# Patient Record
Sex: FEMALE | Race: WHITE | NOT HISPANIC OR LATINO | Employment: UNEMPLOYED | URBAN - NONMETROPOLITAN AREA
[De-identification: names, ages, dates, MRNs, and addresses within clinical notes are randomized per-mention and may not be internally consistent; named-entity substitution may affect disease eponyms.]

---
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
## 2021-04-30 ENCOUNTER — OFFICE VISIT (OUTPATIENT)
Dept: FAMILY MEDICINE CLINIC | Facility: CLINIC | Age: 21
End: 2021-04-30

## 2021-04-30 VITALS
HEART RATE: 97 BPM | BODY MASS INDEX: 23.53 KG/M2 | OXYGEN SATURATION: 99 % | HEIGHT: 61 IN | SYSTOLIC BLOOD PRESSURE: 116 MMHG | WEIGHT: 124.6 LBS | DIASTOLIC BLOOD PRESSURE: 76 MMHG

## 2021-04-30 DIAGNOSIS — F41.1 GAD (GENERALIZED ANXIETY DISORDER): ICD-10-CM

## 2021-04-30 DIAGNOSIS — G44.229 CHRONIC TENSION-TYPE HEADACHE, NOT INTRACTABLE: Primary | ICD-10-CM

## 2021-04-30 PROCEDURE — 99214 OFFICE O/P EST MOD 30 MIN: CPT | Performed by: NURSE PRACTITIONER

## 2021-04-30 RX ORDER — BUSPIRONE HYDROCHLORIDE 5 MG/1
5 TABLET ORAL 3 TIMES DAILY PRN
Qty: 90 TABLET | Refills: 1 | Status: SHIPPED | OUTPATIENT
Start: 2021-04-30

## 2021-04-30 RX ORDER — IBUPROFEN 800 MG/1
800 TABLET ORAL EVERY 8 HOURS PRN
Qty: 90 TABLET | Refills: 3 | Status: SHIPPED | OUTPATIENT
Start: 2021-04-30 | End: 2021-06-09

## 2021-05-07 ENCOUNTER — TELEPHONE (OUTPATIENT)
Dept: FAMILY MEDICINE CLINIC | Facility: CLINIC | Age: 21
End: 2021-05-07

## 2021-05-07 RX ORDER — HYDROXYZINE HYDROCHLORIDE 10 MG/1
TABLET, FILM COATED ORAL
Qty: 30 TABLET | Refills: 0 | Status: SHIPPED | OUTPATIENT
Start: 2021-05-07 | End: 2021-06-09

## 2021-06-15 ENCOUNTER — OFFICE VISIT (OUTPATIENT)
Dept: PODIATRY | Facility: CLINIC | Age: 21
End: 2021-06-15

## 2021-06-15 ENCOUNTER — TELEPHONE (OUTPATIENT)
Dept: PODIATRY | Facility: CLINIC | Age: 21
End: 2021-06-15

## 2021-06-15 VITALS
HEIGHT: 61 IN | SYSTOLIC BLOOD PRESSURE: 122 MMHG | DIASTOLIC BLOOD PRESSURE: 82 MMHG | BODY MASS INDEX: 23.94 KG/M2 | WEIGHT: 126.8 LBS | OXYGEN SATURATION: 99 % | HEART RATE: 96 BPM

## 2021-06-15 DIAGNOSIS — M79.675 PAIN OF TOE OF LEFT FOOT: ICD-10-CM

## 2021-06-15 DIAGNOSIS — L60.0 INGROWN TOENAIL: Primary | ICD-10-CM

## 2021-06-15 DIAGNOSIS — L03.032 PARONYCHIA, TOE, LEFT: ICD-10-CM

## 2021-06-15 PROCEDURE — 11730 AVULSION NAIL PLATE SIMPLE 1: CPT | Performed by: PODIATRIST

## 2021-06-15 PROCEDURE — 99203 OFFICE O/P NEW LOW 30 MIN: CPT | Performed by: PODIATRIST

## 2021-06-15 RX ORDER — IBUPROFEN 800 MG/1
800 TABLET ORAL EVERY 6 HOURS PRN
COMMUNITY
End: 2022-07-28

## 2021-06-15 NOTE — TELEPHONE ENCOUNTER
Note was mailed to patient and patient also activated [employer] to be able to access letter.

## 2021-06-15 NOTE — PROGRESS NOTES
"Chula Avery  2000  20 y.o. female     Patient presents to clinic today with the complaint of left hallux ingrown toenail.     06/15/2021  Chief Complaint   Patient presents with   • Left Foot - Ingrown Toenail           History of Present Illness    Chula Avery is a 20 y.o. female who presents for evaluation of left great toe pain.  She feels this due to an ingrowing toenail.  There is associated redness swelling and drainage.  She describes her pain as sharp with direct pressure.  States is been ongoing for little over a month.  Denies any significant prior issues with ingrowing toenails.      Past Medical History:   Diagnosis Date   • Anxiety    • Depression    • Headache    • Visual impairment          History reviewed. No pertinent surgical history.      Family History   Problem Relation Age of Onset   • Arthritis Mother    • Hyperlipidemia Mother    • Arthritis Father    • Hyperlipidemia Father          Social History     Socioeconomic History   • Marital status: Single     Spouse name: Not on file   • Number of children: Not on file   • Years of education: Not on file   • Highest education level: Not on file   Tobacco Use   • Smoking status: Never Smoker   • Smokeless tobacco: Never Used   Vaping Use   • Vaping Use: Never used   Substance and Sexual Activity   • Alcohol use: Never   • Drug use: Never   • Sexual activity: Defer         Current Outpatient Medications   Medication Sig Dispense Refill   • busPIRone (BUSPAR) 5 MG tablet Take 1 tablet by mouth 3 (Three) Times a Day As Needed (anxiety). 90 tablet 1   • ibuprofen (ADVIL,MOTRIN) 800 MG tablet Take 800 mg by mouth Every 6 (Six) Hours As Needed for Mild Pain .     • sulfamethoxazole-trimethoprim (BACTRIM DS,SEPTRA DS) 800-160 MG per tablet Take 1 tablet by mouth 2 (Two) Times a Day for 7 days. 14 tablet 0     No current facility-administered medications for this visit.         OBJECTIVE    /82   Pulse 96   Ht 153.7 cm (60.5\")   Wt 57.5 " kg (126 lb 12.8 oz)   LMP 06/09/2021   SpO2 99%   BMI 24.36 kg/m²       Review of Systems   Constitutional: Negative.    HENT: Negative.    Eyes: Negative.    Respiratory: Negative.    Cardiovascular: Negative.    Gastrointestinal: Negative.    Endocrine: Negative.    Genitourinary: Negative.    Musculoskeletal: Negative.    Skin: Negative.    Allergic/Immunologic: Negative.    Neurological: Negative.    Hematological: Negative.    Psychiatric/Behavioral: The patient is nervous/anxious.          Physical Exam   Constitutional: she appears well-developed and well-nourished.   CV: No chest pain. Normal RR  Resp: Non labored respirations  Psychiatric: she has a normal mood and affect. her behavior is normal.      Lower Extremity Exam:  Vascular: DP/PT pulses palpable 2+.   Mild left hallux edema  Toes warm  Neuro: Protective sensation intact, b/l.  DTRs intact  Integument: No open wounds.  Ingrown medial nail border, left hallux. Mild erythema, edema. +tenderness to palpation.  Web spaces c/d/i  No skin lesions  Musculoskeletal: LE muscle strength 5/5.   Gait normal  Ankle ROM full without pain or crepitus  STJ ROM full without pain or crepitus  No digital deformities      Nail Removal    Date/Time: 6/15/2021 2:58 PM  Performed by: Shashi Carye DPM  Authorized by: Shashi Carey DPM   Location: left foot  Location details: left big toe  Anesthesia: digital block    Anesthesia:  Local Anesthetic: lidocaine 2% without epinephrine  Anesthetic total: 3 mL  Preparation: skin prepped with Betadine  Amount removed: partial  Side: medial  Nail matrix removed: none  Dressing: 4x4 and antibiotic ointment  Patient tolerance: patient tolerated the procedure well with no immediate complications                ASSESSMENT AND PLAN    Diagnoses and all orders for this visit:    1. Ingrown toenail (Primary)    2. Pain of toe of left foot    3. Paronychia, toe, left      -Comprehensive foot and ankle exam  performed  -Diagnosis, prevention, and treatment of ingrown toe nails discussed with patient, including risks and potential benefits of nail avulsion both temporary and permanent versus simple debridement.  -Pt elected for partial  avulsion of left hallux  -Aftercare instructions for soapy gold soaks BID  -Recheck 2 weeks          This document has been electronically signed by Shashi Carey DPM on Meri 15, 2021 14:58 CDT       Much of this encounter note is an electronic transcription/translation of spoken language to printed text.   Shashi Carey DPM  6/15/2021  14:58 CDT

## 2021-06-15 NOTE — TELEPHONE ENCOUNTER
Patient was in here today for nail removal and states she was told could not work today or tomorrow and needs a work excuse.  She is to far away wants to know if you could mail or fax.  Any questions you may call [phone]

## 2021-09-06 PROCEDURE — U0004 COV-19 TEST NON-CDC HGH THRU: HCPCS | Performed by: NURSE PRACTITIONER

## 2021-09-07 ENCOUNTER — LAB (OUTPATIENT)
Dept: LAB | Facility: OTHER | Age: 21
End: 2021-09-07

## 2021-09-09 ENCOUNTER — LAB (OUTPATIENT)
Dept: LAB | Facility: OTHER | Age: 21
End: 2021-09-09

## 2021-09-09 PROCEDURE — U0004 COV-19 TEST NON-CDC HGH THRU: HCPCS | Performed by: PHYSICIAN ASSISTANT

## 2022-07-28 ENCOUNTER — OFFICE VISIT (OUTPATIENT)
Dept: FAMILY MEDICINE CLINIC | Facility: CLINIC | Age: 22
End: 2022-07-28

## 2022-07-28 VITALS
WEIGHT: 111.4 LBS | OXYGEN SATURATION: 98 % | SYSTOLIC BLOOD PRESSURE: 108 MMHG | BODY MASS INDEX: 21.87 KG/M2 | HEIGHT: 60 IN | DIASTOLIC BLOOD PRESSURE: 76 MMHG | HEART RATE: 89 BPM

## 2022-07-28 DIAGNOSIS — F41.9 ANXIETY: Chronic | ICD-10-CM

## 2022-07-28 DIAGNOSIS — Z00.00 ANNUAL PHYSICAL EXAM: Primary | ICD-10-CM

## 2022-07-28 DIAGNOSIS — D50.9 IRON DEFICIENCY ANEMIA, UNSPECIFIED IRON DEFICIENCY ANEMIA TYPE: Chronic | ICD-10-CM

## 2022-07-28 DIAGNOSIS — R63.4 WEIGHT LOSS: ICD-10-CM

## 2022-07-28 DIAGNOSIS — F12.90 MARIJUANA USE: Chronic | ICD-10-CM

## 2022-07-28 DIAGNOSIS — Z76.89 ENCOUNTER TO ESTABLISH CARE: ICD-10-CM

## 2022-07-28 DIAGNOSIS — R53.83 FATIGUE, UNSPECIFIED TYPE: ICD-10-CM

## 2022-07-28 PROCEDURE — 2014F MENTAL STATUS ASSESS: CPT | Performed by: NURSE PRACTITIONER

## 2022-07-28 PROCEDURE — 3008F BODY MASS INDEX DOCD: CPT | Performed by: NURSE PRACTITIONER

## 2022-07-28 PROCEDURE — 99395 PREV VISIT EST AGE 18-39: CPT | Performed by: NURSE PRACTITIONER

## 2022-07-28 NOTE — PROGRESS NOTES
CC: Establish Care (Wanting brain scanned, memory loss ), Annual Exam, and Weight Loss (Concerned about weight loss )      Subjective:  Chula Avery is a 21 y.o. female who presents for     Patient presents to office today to establish care she is due for checkup with labs.    CMH: Migraines, iron deficiency anemia, and anxiety. Not currently on iron therapy.  SxHx: None  FH: Arthritis, Hyperlipidemia, Heart Dz in mother  SH: Denies tobacco, drinks alcohol on occasion, Smokes marijuana on occasion    Concerned about weight loss. Ongoing for about a month now. States was 130 and now is 111 lbs. Doesn't have an appetite. States she has to force herself to eat. Denies associated pain, n/v, or diarrhea. She does have associated fatigue. States she is tired most of the time.     States doesn't plan on getting the COVID 19 vaccine. Declines to get HPV immunization today.      The following portions of the patient's history were reviewed and updated as appropriate: allergies, current medications, past family history, past medical history, past social history, past surgical history and problem list.    Past Medical History:   Diagnosis Date   • Anxiety    • Depression    • Headache    • Visual impairment          Current Outpatient Medications:   •  busPIRone (BUSPAR) 5 MG tablet, Take 1 tablet by mouth 3 (Three) Times a Day As Needed (anxiety)., Disp: 90 tablet, Rfl: 1  •  Zafemy 150-35 MCG/24HR, APPLY 1 PATCH TOPICALLY ONCE A WEEK CONTINUOUSLY AS DIRECTED, Disp: , Rfl:     Review of Systems    Review of Systems   Constitutional: Positive for activity change, appetite change, fatigue and unexpected weight change.   HENT: Negative.    Eyes: Negative.    Respiratory: Negative.    Cardiovascular: Negative.    Gastrointestinal: Negative.    Endocrine: Negative.    Genitourinary: Negative.    Musculoskeletal: Negative.    Skin: Negative.    Allergic/Immunologic: Negative.    Neurological: Negative.    Hematological: Negative.   "  Psychiatric/Behavioral: Negative.    All other systems reviewed and are negative.      Objective  Vitals:    07/28/22 1434   BP: 108/76   Pulse: 89   SpO2: 98%   Weight: 50.5 kg (111 lb 6.4 oz)   Height: 152.4 cm (60\")     Body mass index is 21.76 kg/m².    Physical Exam    Physical Exam  Vitals and nursing note reviewed.   Constitutional:       General: She is not in acute distress.     Appearance: Normal appearance. She is well-developed. She is not ill-appearing, toxic-appearing or diaphoretic.   HENT:      Head: Normocephalic and atraumatic.   Eyes:      General: No scleral icterus.        Right eye: No discharge.         Left eye: No discharge.      Extraocular Movements: Extraocular movements intact.      Conjunctiva/sclera: Conjunctivae normal.   Neck:      Vascular: No carotid bruit.   Cardiovascular:      Rate and Rhythm: Normal rate and regular rhythm.      Pulses: Normal pulses.      Heart sounds: Normal heart sounds. No murmur heard.    No friction rub. No gallop.   Pulmonary:      Effort: Pulmonary effort is normal. No respiratory distress.      Breath sounds: Normal breath sounds. No stridor. No wheezing, rhonchi or rales.   Chest:      Chest wall: No tenderness.   Abdominal:      General: Bowel sounds are normal. There is no distension.      Palpations: Abdomen is soft. There is no mass.      Tenderness: There is no abdominal tenderness. There is no guarding or rebound.      Hernia: No hernia is present.   Musculoskeletal:      Cervical back: Normal range of motion and neck supple. No rigidity or tenderness. No muscular tenderness.      Right lower leg: No edema.      Left lower leg: No edema.   Lymphadenopathy:      Cervical: No cervical adenopathy.   Skin:     General: Skin is warm and dry.      Capillary Refill: Capillary refill takes less than 2 seconds.      Coloration: Skin is not jaundiced or pale.      Findings: No bruising, erythema, lesion or rash.   Neurological:      General: No focal " deficit present.      Mental Status: She is alert and oriented to person, place, and time. Mental status is at baseline.   Psychiatric:         Mood and Affect: Mood normal.         Behavior: Behavior normal.         Thought Content: Thought content normal.         Judgment: Judgment normal.             Diagnoses and all orders for this visit:    1. Annual physical exam (Primary)  -     CBC w AUTO Differential; Future  -     Comprehensive metabolic panel; Future  -     TSH  -     T3, free; Future  -     T4, free; Future  -     Lipid Panel With LDL/HDL Ratio; Future  -     Hemoglobin A1c  -     Hepatitis C antibody; Future    2. Encounter to establish care    3. Anxiety    4. Iron deficiency anemia, unspecified iron deficiency anemia type    5. Fatigue, unspecified type  -     CBC w AUTO Differential; Future  -     Comprehensive metabolic panel; Future  -     TSH  -     T3, free; Future  -     T4, free; Future  -     Iron and TIBC; Future  -     Ferritin; Future  -     Folate; Future  -     Vitamin B12; Future  -     Vitamin D 25 Hydroxy; Future    6. Weight loss  -     CBC w AUTO Differential; Future  -     Comprehensive metabolic panel; Future  -     TSH  -     T3, free; Future  -     T4, free; Future  -     Iron and TIBC; Future  -     Ferritin; Future  -     Folate; Future  -     Vitamin B12; Future  -     Vitamin D 25 Hydroxy; Future    7. Marijuana use       Patient establish care today.  Annual physical exam performed.  Will check routine labs as above and a thyroid panel and anemia panel for the weight loss and fatigue.  Patient to return fasting to have these labs drawn.  Will notify patient of these results once they are available.  Patient counseled on needed immunizations today.  She declines COVID and HPV vaccines today.  Patient counseled on healthy diet including adding protein shakes to her meals to help with the weight loss.  Patient to follow-up in 6 months or sooner if needed for recheck.  Answered  all questions.  Patient verbalized understanding of plan of care.        This document has been electronically signed by DAMION Chavez on July 28, 2022 14:55 CDT

## 2022-07-29 ENCOUNTER — LAB (OUTPATIENT)
Dept: LAB | Facility: OTHER | Age: 22
End: 2022-07-29

## 2022-07-29 DIAGNOSIS — R53.83 FATIGUE, UNSPECIFIED TYPE: ICD-10-CM

## 2022-07-29 DIAGNOSIS — Z00.00 ANNUAL PHYSICAL EXAM: ICD-10-CM

## 2022-07-29 DIAGNOSIS — R63.4 WEIGHT LOSS: ICD-10-CM

## 2022-07-29 LAB
25(OH)D3 SERPL-MCNC: 36.3 NG/ML (ref 30–100)
ALBUMIN SERPL-MCNC: 4.3 G/DL (ref 3.5–5)
ALBUMIN/GLOB SERPL: 1.3 G/DL (ref 1.1–1.8)
ALP SERPL-CCNC: 55 U/L (ref 38–126)
ALT SERPL W P-5'-P-CCNC: 12 U/L
ANION GAP SERPL CALCULATED.3IONS-SCNC: 10 MMOL/L (ref 5–15)
AST SERPL-CCNC: 17 U/L (ref 14–36)
BASOPHILS # BLD AUTO: 0.04 10*3/MM3 (ref 0–0.2)
BASOPHILS NFR BLD AUTO: 0.7 % (ref 0–1.5)
BILIRUB SERPL-MCNC: 0.2 MG/DL (ref 0.2–1.3)
BUN SERPL-MCNC: 11 MG/DL (ref 7–23)
BUN/CREAT SERPL: 17.5 (ref 7–25)
CALCIUM SPEC-SCNC: 9.3 MG/DL (ref 8.4–10.2)
CHLORIDE SERPL-SCNC: 106 MMOL/L (ref 101–112)
CHOLEST SERPL-MCNC: 137 MG/DL (ref 0–200)
CO2 SERPL-SCNC: 26 MMOL/L (ref 22–30)
CREAT SERPL-MCNC: 0.63 MG/DL (ref 0.52–1.04)
DEPRECATED RDW RBC AUTO: 45.3 FL (ref 37–54)
EGFRCR SERPLBLD CKD-EPI 2021: 130.4 ML/MIN/1.73
EOSINOPHIL # BLD AUTO: 0.14 10*3/MM3 (ref 0–0.4)
EOSINOPHIL NFR BLD AUTO: 2.5 % (ref 0.3–6.2)
ERYTHROCYTE [DISTWIDTH] IN BLOOD BY AUTOMATED COUNT: 16.3 % (ref 12.3–15.4)
FERRITIN SERPL-MCNC: 7.3 NG/ML (ref 13–150)
FOLATE SERPL-MCNC: 19.6 NG/ML (ref 4.78–24.2)
GLOBULIN UR ELPH-MCNC: 3.3 GM/DL (ref 2.3–3.5)
GLUCOSE SERPL-MCNC: 96 MG/DL (ref 70–99)
HBA1C MFR BLD: 4.7 % (ref 4.8–5.6)
HCT VFR BLD AUTO: 33.5 % (ref 34–46.6)
HCV AB SER DONR QL: NORMAL
HDLC SERPL-MCNC: 59 MG/DL (ref 40–60)
HGB BLD-MCNC: 10.6 G/DL (ref 12–15.9)
IRON 24H UR-MRATE: 15 MCG/DL (ref 37–145)
IRON SATN MFR SERPL: 3 % (ref 20–50)
LDLC SERPL CALC-MCNC: 60 MG/DL (ref 0–100)
LDLC/HDLC SERPL: 0.98 {RATIO}
LYMPHOCYTES # BLD AUTO: 2.03 10*3/MM3 (ref 0.7–3.1)
LYMPHOCYTES NFR BLD AUTO: 36.9 % (ref 19.6–45.3)
MCH RBC QN AUTO: 24.8 PG (ref 26.6–33)
MCHC RBC AUTO-ENTMCNC: 31.6 G/DL (ref 31.5–35.7)
MCV RBC AUTO: 78.5 FL (ref 79–97)
MONOCYTES # BLD AUTO: 0.6 10*3/MM3 (ref 0.1–0.9)
MONOCYTES NFR BLD AUTO: 10.9 % (ref 5–12)
NEUTROPHILS NFR BLD AUTO: 2.69 10*3/MM3 (ref 1.7–7)
NEUTROPHILS NFR BLD AUTO: 49 % (ref 42.7–76)
PLATELET # BLD AUTO: 307 10*3/MM3 (ref 140–450)
PMV BLD AUTO: 9.6 FL (ref 6–12)
POTASSIUM SERPL-SCNC: 3.8 MMOL/L (ref 3.4–5)
PROT SERPL-MCNC: 7.6 G/DL (ref 6.3–8.6)
RBC # BLD AUTO: 4.27 10*6/MM3 (ref 3.77–5.28)
SODIUM SERPL-SCNC: 142 MMOL/L (ref 137–145)
T3FREE SERPL-MCNC: 3.08 PG/ML (ref 2–4.4)
T4 FREE SERPL-MCNC: 1.25 NG/DL (ref 0.93–1.7)
TIBC SERPL-MCNC: 466 MCG/DL (ref 298–536)
TRANSFERRIN SERPL-MCNC: 313 MG/DL (ref 200–360)
TRIGL SERPL-MCNC: 100 MG/DL (ref 0–150)
TSH SERPL DL<=0.05 MIU/L-ACNC: 4.19 UIU/ML (ref 0.27–4.2)
VIT B12 BLD-MCNC: 344 PG/ML (ref 211–946)
VLDLC SERPL-MCNC: 18 MG/DL (ref 5–40)
WBC NRBC COR # BLD: 5.5 10*3/MM3 (ref 3.4–10.8)

## 2022-07-29 PROCEDURE — 86803 HEPATITIS C AB TEST: CPT | Performed by: NURSE PRACTITIONER

## 2022-07-29 PROCEDURE — 82746 ASSAY OF FOLIC ACID SERUM: CPT | Performed by: NURSE PRACTITIONER

## 2022-07-29 PROCEDURE — 83540 ASSAY OF IRON: CPT | Performed by: NURSE PRACTITIONER

## 2022-07-29 PROCEDURE — 80061 LIPID PANEL: CPT | Performed by: NURSE PRACTITIONER

## 2022-07-29 PROCEDURE — 80050 GENERAL HEALTH PANEL: CPT | Performed by: NURSE PRACTITIONER

## 2022-07-29 PROCEDURE — 84439 ASSAY OF FREE THYROXINE: CPT | Performed by: NURSE PRACTITIONER

## 2022-07-29 PROCEDURE — 82306 VITAMIN D 25 HYDROXY: CPT | Performed by: NURSE PRACTITIONER

## 2022-07-29 PROCEDURE — 82607 VITAMIN B-12: CPT | Performed by: NURSE PRACTITIONER

## 2022-07-29 PROCEDURE — 84481 FREE ASSAY (FT-3): CPT | Performed by: NURSE PRACTITIONER

## 2022-07-29 PROCEDURE — 82728 ASSAY OF FERRITIN: CPT | Performed by: NURSE PRACTITIONER

## 2022-07-29 PROCEDURE — 83036 HEMOGLOBIN GLYCOSYLATED A1C: CPT | Performed by: NURSE PRACTITIONER

## 2022-07-29 PROCEDURE — 84466 ASSAY OF TRANSFERRIN: CPT | Performed by: NURSE PRACTITIONER

## 2022-08-01 RX ORDER — FERROUS SULFATE 325(65) MG
325 TABLET ORAL 2 TIMES DAILY
Qty: 60 TABLET | Refills: 5 | Status: SHIPPED | OUTPATIENT
Start: 2022-08-01

## 2022-08-02 ENCOUNTER — OFFICE VISIT (OUTPATIENT)
Dept: FAMILY MEDICINE CLINIC | Facility: CLINIC | Age: 22
End: 2022-08-02

## 2022-08-02 VITALS — BODY MASS INDEX: 21.79 KG/M2 | WEIGHT: 111 LBS | HEIGHT: 60 IN

## 2022-08-02 DIAGNOSIS — G47.00 INSOMNIA, UNSPECIFIED TYPE: Primary | ICD-10-CM

## 2022-08-02 DIAGNOSIS — R05.9 COUGH: ICD-10-CM

## 2022-08-02 PROCEDURE — 99442 PR PHYS/QHP TELEPHONE EVALUATION 11-20 MIN: CPT | Performed by: NURSE PRACTITIONER

## 2022-08-02 RX ORDER — METHYLPREDNISOLONE 4 MG/1
TABLET ORAL
Qty: 21 TABLET | Refills: 0 | Status: SHIPPED | OUTPATIENT
Start: 2022-08-02 | End: 2022-08-15

## 2022-08-02 RX ORDER — TRAZODONE HYDROCHLORIDE 50 MG/1
50 TABLET ORAL NIGHTLY
Qty: 30 TABLET | Refills: 5 | Status: SHIPPED | OUTPATIENT
Start: 2022-08-02 | End: 2022-08-15

## 2022-08-02 NOTE — PROGRESS NOTES
CC: trouble sleeping (Taking melatonin, not helping. Trouble falling asleep and staying asleep ) and URI (Started 1-2 weeks ago, coughing stopped up and sore throat )      Subjective:  Chula Avery is a 21 y.o. female who presents for         This is a telephone visit because of pandemic.  Patient complains of trouble sleeping.  She takes melatonin which is not helping she has trouble falling asleep and staying asleep. Ongoing for as long as she can remember. States she has to take 6 melatonin gummies and Nyquil to help her sleep. Gets about 3-12 hours of sleep per night. States her sleep cycle fluctuates drastically. States melatonin and Nyquil is the only thing she has ever taken to help her sleep.    C/O cough. Ongoing x 1 week. The cough is dry. States her throat is sore from coughing. States she took an at home covid test and it was negative. Has treated with OTC allergy medication without resolution of cough. She has had some nasal congestion and the allergy medication helped that.      The following portions of the patient's history were reviewed and updated as appropriate: allergies, current medications, past family history, past medical history, past social history, past surgical history and problem list.    Past Medical History:   Diagnosis Date   • Anxiety    • Depression    • Headache    • Visual impairment          Current Outpatient Medications:   •  busPIRone (BUSPAR) 5 MG tablet, Take 1 tablet by mouth 3 (Three) Times a Day As Needed (anxiety)., Disp: 90 tablet, Rfl: 1  •  ferrous sulfate 325 (65 FE) MG tablet, Take 1 tablet by mouth 2 (Two) Times a Day., Disp: 60 tablet, Rfl: 5  •  Zafemy 150-35 MCG/24HR, APPLY 1 PATCH TOPICALLY ONCE A WEEK CONTINUOUSLY AS DIRECTED, Disp: , Rfl:   •  methylPREDNISolone (MEDROL) 4 MG dose pack, Take as directed on package instructions., Disp: 21 tablet, Rfl: 0  •  traZODone (DESYREL) 50 MG tablet, Take 1 tablet by mouth Every Night., Disp: 30 tablet, Rfl:  "5    Review of Systems    Review of Systems   Constitutional: Negative.    HENT: Positive for congestion and sore throat.    Eyes: Negative.    Respiratory: Positive for cough.    Cardiovascular: Negative.    Gastrointestinal: Negative.    Endocrine: Negative.    Genitourinary: Negative.    Musculoskeletal: Negative.    Skin: Negative.    Allergic/Immunologic: Negative.    Neurological: Negative.    Hematological: Negative.    Psychiatric/Behavioral: Positive for sleep disturbance.   All other systems reviewed and are negative.      Objective  Vitals:    08/02/22 1258   Weight: 50.3 kg (111 lb)   Height: 152.4 cm (60\")     Body mass index is 21.68 kg/m².    Physical Exam    Physical Exam  Deferred, telephone visit because of pandemic      Diagnoses and all orders for this visit:    1. Insomnia, unspecified type (Primary)  -     traZODone (DESYREL) 50 MG tablet; Take 1 tablet by mouth Every Night.  Dispense: 30 tablet; Refill: 5    2. Cough  -     methylPREDNISolone (MEDROL) 4 MG dose pack; Take as directed on package instructions.  Dispense: 21 tablet; Refill: 0         Patient with insomnia.  She has taken NyQuil and melatonin in the past to help her sleep and this does not seem to be helping anymore.  We will start patient on trazodone 50 mg to be taken every night.  Advised to take this about an hour prior to bedtime.  Advised to devote 7 to 8 hours of sleep once this is been taken.  Advised healthy nighttime activities to help reduce insomnia.  For the cough will start patient on a Medrol Dosepak.  Patient instructed on how to take these new medications and possible side effects.  Did advise that the Medrol Dosepak could interfere with her sleep.  Patient to follow-up in 6 months or sooner if needed.  Answered all questions.  Patient verbalized understanding of plan of care.        This document has been electronically signed by DAMION Chavez on August 2, 2022 13:09 CDT     You have chosen to receive " care through a telephone visit. Do you consent to use a telephone visit for your medical care today? Yes  This visit has been rescheduled as a phone visit to comply with patient safety concerns in accordance with CDC recommendations. Total time of discussion was 11 minutes.

## 2022-08-15 ENCOUNTER — OFFICE VISIT (OUTPATIENT)
Dept: FAMILY MEDICINE CLINIC | Facility: CLINIC | Age: 22
End: 2022-08-15

## 2022-08-15 VITALS — WEIGHT: 111 LBS | HEIGHT: 60 IN | BODY MASS INDEX: 21.79 KG/M2

## 2022-08-15 DIAGNOSIS — G47.00 INSOMNIA, UNSPECIFIED TYPE: Primary | ICD-10-CM

## 2022-08-15 PROCEDURE — 99441 PR PHYS/QHP TELEPHONE EVALUATION 5-10 MIN: CPT | Performed by: NURSE PRACTITIONER

## 2022-08-15 RX ORDER — DOXEPIN HYDROCHLORIDE 100 MG/1
100 CAPSULE ORAL NIGHTLY
Qty: 30 CAPSULE | Refills: 5 | Status: SHIPPED | OUTPATIENT
Start: 2022-08-15 | End: 2022-12-28 | Stop reason: DRUGHIGH

## 2022-08-15 NOTE — PROGRESS NOTES
CC: Change in medicine (Sleep medicine )      Subjective:  Chula Avery is a 21 y.o. female who presents for         This is a telephone visit because of pandemic. Pt states the sleeping medication isn't helping. States it feels like it is keeping her up instead of helping her to sleep. She is wondering if we could change this medication. Pt has trouble falling asleep. States once she falls asleep, she will usually stay asleep. Has been taking the Trazodone for about 2 weeks without resolution of the insomnia. States she gets about 3-6 hours of sleep per night.       The following portions of the patient's history were reviewed and updated as appropriate: allergies, current medications, past family history, past medical history, past social history, past surgical history and problem list.    Past Medical History:   Diagnosis Date   • Anxiety    • Depression    • Headache    • Visual impairment          Current Outpatient Medications:   •  busPIRone (BUSPAR) 5 MG tablet, Take 1 tablet by mouth 3 (Three) Times a Day As Needed (anxiety)., Disp: 90 tablet, Rfl: 1  •  ferrous sulfate 325 (65 FE) MG tablet, Take 1 tablet by mouth 2 (Two) Times a Day., Disp: 60 tablet, Rfl: 5  •  Zafemy 150-35 MCG/24HR, APPLY 1 PATCH TOPICALLY ONCE A WEEK CONTINUOUSLY AS DIRECTED, Disp: , Rfl:   •  doxepin (SINEquan) 100 MG capsule, Take 1 capsule by mouth Every Night., Disp: 30 capsule, Rfl: 5    Review of Systems    Review of Systems   Constitutional: Negative.    HENT: Negative.    Eyes: Negative.    Respiratory: Negative.    Cardiovascular: Negative.    Gastrointestinal: Negative.    Endocrine: Negative.    Genitourinary: Negative.    Musculoskeletal: Negative.    Skin: Negative.    Allergic/Immunologic: Negative.    Neurological: Negative.    Hematological: Negative.    Psychiatric/Behavioral: Positive for sleep disturbance.   All other systems reviewed and are negative.      Objective  Vitals:    08/15/22 1005   Weight: 50.3 kg (111  "lb)   Height: 152.4 cm (60\")     Body mass index is 21.68 kg/m².    Physical Exam    Physical Exam  Deferred, Telephone visit because of pandemic      Diagnoses and all orders for this visit:    1. Insomnia, unspecified type (Primary)  -     doxepin (SINEquan) 100 MG capsule; Take 1 capsule by mouth Every Night.  Dispense: 30 capsule; Refill: 5       We will change the trazodone to doxepin 100 mg p.o. nightly.  Patient instructed on how to take this medication and possible side effects.  Patient advised of calming activities prior to bed like a warm bath.  Also advised not to engage in any stimulating activities prior to bed such as watching TV with a lot of violence.  Patient advised to follow-up as needed.  Answered all questions.  Patient verbalized understanding of plan of care.          This document has been electronically signed by DAMION Chavez on August 15, 2022 10:12 CDT     You have chosen to receive care through a telephone visit. Do you consent to use a telephone visit for your medical care today? Yes  This visit has been rescheduled as a phone visit to comply with patient safety concerns in accordance with CDC recommendations. Total time of discussion was 7 minutes.        "

## 2022-08-29 ENCOUNTER — PRIOR AUTHORIZATION (OUTPATIENT)
Dept: FAMILY MEDICINE CLINIC | Facility: CLINIC | Age: 22
End: 2022-08-29

## 2022-08-29 NOTE — TELEPHONE ENCOUNTER
Chula Avery (Gaytan: [id number]) - [id number]  Doxepin HCl 100MG capsules     Status: PA Response - Approved  Created: August 15th, 2022

## 2022-12-28 ENCOUNTER — OFFICE VISIT (OUTPATIENT)
Dept: FAMILY MEDICINE CLINIC | Facility: CLINIC | Age: 22
End: 2022-12-28

## 2022-12-28 VITALS
WEIGHT: 114 LBS | DIASTOLIC BLOOD PRESSURE: 80 MMHG | HEART RATE: 86 BPM | BODY MASS INDEX: 22.38 KG/M2 | HEIGHT: 60 IN | SYSTOLIC BLOOD PRESSURE: 116 MMHG | OXYGEN SATURATION: 99 %

## 2022-12-28 DIAGNOSIS — G47.00 INSOMNIA, UNSPECIFIED TYPE: Chronic | ICD-10-CM

## 2022-12-28 DIAGNOSIS — Z00.00 PHYSICAL EXAM: Primary | ICD-10-CM

## 2022-12-28 DIAGNOSIS — F69 BEHAVIOR CONCERN IN ADULT: ICD-10-CM

## 2022-12-28 PROCEDURE — 99213 OFFICE O/P EST LOW 20 MIN: CPT | Performed by: NURSE PRACTITIONER

## 2022-12-28 RX ORDER — DOXEPIN HYDROCHLORIDE 50 MG/1
50 CAPSULE ORAL NIGHTLY
Qty: 30 CAPSULE | Refills: 5 | Status: SHIPPED | OUTPATIENT
Start: 2022-12-28

## 2022-12-28 NOTE — PROGRESS NOTES
CC: Annual Exam (Foster physical, wants to be tested for Autism )      Subjective:  Chula Avery is a 22 y.o. female who presents for         Here for foster care physical.  Patient's mother is applying to be a foster mom.  Patient lives in the household.  Also, concerned that she may have autism. Wanting a referral for testing for this. Also, requesting the Doxepin be lowered because she is sleeping too much.      The following portions of the patient's history were reviewed and updated as appropriate: allergies, current medications, past family history, past medical history, past social history, past surgical history and problem list.    Past Medical History:   Diagnosis Date   • Anxiety    • Depression    • Headache    • Visual impairment          Current Outpatient Medications:   •  brompheniramine-pseudoephedrine-DM (Bromfed DM) 30-2-10 MG/5ML syrup, Take 5 mL by mouth Every 4 (Four) Hours As Needed for Congestion or Cough., Disp: 120 mL, Rfl: 0  •  busPIRone (BUSPAR) 5 MG tablet, Take 1 tablet by mouth 3 (Three) Times a Day As Needed (anxiety)., Disp: 90 tablet, Rfl: 1  •  ferrous sulfate 325 (65 FE) MG tablet, Take 1 tablet by mouth 2 (Two) Times a Day., Disp: 60 tablet, Rfl: 5  •  Zafemy 150-35 MCG/24HR, APPLY 1 PATCH TOPICALLY ONCE A WEEK CONTINUOUSLY AS DIRECTED, Disp: , Rfl:   •  doxepin (SINEquan) 50 MG capsule, Take 1 capsule by mouth Every Night., Disp: 30 capsule, Rfl: 5    Review of Systems    Review of Systems   Constitutional: Negative.    HENT: Negative.    Eyes: Negative.    Respiratory: Negative.    Cardiovascular: Negative.    Gastrointestinal: Negative.    Endocrine: Negative.    Genitourinary: Negative.    Musculoskeletal: Negative.    Skin: Negative.    Allergic/Immunologic: Negative.    Neurological: Negative.    Hematological: Negative.    Psychiatric/Behavioral: Positive for behavioral problems and sleep disturbance.   All other systems reviewed and are negative.      Objective  Vitals:  "   12/28/22 1041   BP: 116/80   Pulse: 86   SpO2: 99%   Weight: 51.7 kg (114 lb)   Height: 152.4 cm (60\")     Body mass index is 22.26 kg/m².    Physical Exam    Physical Exam  Vitals and nursing note reviewed.   Constitutional:       General: She is not in acute distress.     Appearance: Normal appearance. She is not ill-appearing, toxic-appearing or diaphoretic.   HENT:      Head: Normocephalic and atraumatic.   Cardiovascular:      Rate and Rhythm: Normal rate and regular rhythm.      Pulses: Normal pulses.      Heart sounds: Normal heart sounds. No murmur heard.    No friction rub. No gallop.   Pulmonary:      Effort: Pulmonary effort is normal. No respiratory distress.      Breath sounds: Normal breath sounds. No stridor. No wheezing, rhonchi or rales.   Chest:      Chest wall: No tenderness.   Abdominal:      General: Abdomen is flat. Bowel sounds are normal. There is no distension.      Palpations: Abdomen is soft. There is no mass.      Tenderness: There is no abdominal tenderness. There is no guarding or rebound.      Hernia: No hernia is present.   Musculoskeletal:      Cervical back: Normal range of motion and neck supple. No rigidity or tenderness.      Right lower leg: No edema.      Left lower leg: No edema.   Lymphadenopathy:      Cervical: No cervical adenopathy.   Skin:     General: Skin is warm and dry.   Neurological:      Mental Status: She is alert.             Diagnoses and all orders for this visit:    1. Physical exam (Primary)  Comments:  For perspective foster sibling    2. Behavior concern in adult  -     Ambulatory Referral to Psychiatry    3. Insomnia, unspecified type  -     doxepin (SINEquan) 50 MG capsule; Take 1 capsule by mouth Every Night.  Dispense: 30 capsule; Refill: 5         Patient in for foster care physical.  Form filled out, copied, and scanned to chart.  Please see this scanned form for details.  For patient's concern for autism, will refer her to mental health for " further evaluation and possible treatment.  We will decrease patient's doxepin to 50 mg nightly for her insomnia as the 100 mg made her sleep too much.  She is due for 6-month follow-up next month.  She is advised to keep this appointment.  Answered all questions.  Patient verbalized understanding of plan of care.        This document has been electronically signed by DAMION Chavez on December 28, 2022 11:57 CST

## 2023-08-17 ENCOUNTER — OFFICE VISIT (OUTPATIENT)
Dept: FAMILY MEDICINE CLINIC | Facility: CLINIC | Age: 23
End: 2023-08-17
Payer: MEDICAID

## 2023-08-17 VITALS — HEIGHT: 61 IN | WEIGHT: 112.4 LBS | BODY MASS INDEX: 21.22 KG/M2 | HEART RATE: 80 BPM | OXYGEN SATURATION: 98 %

## 2023-08-17 DIAGNOSIS — Z00.00 ANNUAL PHYSICAL EXAM: Primary | ICD-10-CM

## 2023-08-17 DIAGNOSIS — E53.8 B12 DEFICIENCY: ICD-10-CM

## 2023-08-17 DIAGNOSIS — F12.90 MARIJUANA USE: Chronic | ICD-10-CM

## 2023-08-17 DIAGNOSIS — F41.9 ANXIETY: ICD-10-CM

## 2023-08-17 DIAGNOSIS — G47.00 INSOMNIA, UNSPECIFIED TYPE: ICD-10-CM

## 2023-08-17 DIAGNOSIS — F32.1 CURRENT MODERATE EPISODE OF MAJOR DEPRESSIVE DISORDER WITHOUT PRIOR EPISODE: ICD-10-CM

## 2023-08-17 DIAGNOSIS — D50.9 IRON DEFICIENCY ANEMIA, UNSPECIFIED IRON DEFICIENCY ANEMIA TYPE: ICD-10-CM

## 2023-08-17 RX ORDER — DULOXETIN HYDROCHLORIDE 30 MG/1
30 CAPSULE, DELAYED RELEASE ORAL DAILY
Qty: 30 CAPSULE | Refills: 5 | Status: SHIPPED | OUTPATIENT
Start: 2023-08-17

## 2023-08-17 RX ORDER — BUSPIRONE HYDROCHLORIDE 10 MG/1
10 TABLET ORAL 3 TIMES DAILY PRN
Qty: 90 TABLET | Refills: 5 | Status: SHIPPED | OUTPATIENT
Start: 2023-08-17

## 2023-08-17 NOTE — PROGRESS NOTES
CC: Med Change Request (Needs something for depression, and anxiety medicine upped )      Subjective:  Chula Avery is a 22 y.o. female who presents for         Patient presents to office she is due for a checkup with labs.  She has migraines in which she is not currently taking any medications for.  She has iron deficiency anemia in which she takes iron replacement therapy for.  She has anxiety in which she takes BuSpar as needed for.  She does c/o depression. Denies SI/HI. Feels down and in the dumps a lot. She cries a lot. She has insomnia which she takes doxepin for.  She has B12 deficiency.  She is not currently taking any B12 replacement therapy.  She stopped smoking marijuana 1 week ago.    She is due for a Pap smear.  This was encouraged.    The following portions of the patient's history were reviewed and updated as appropriate: allergies, current medications, past family history, past medical history, past social history, past surgical history and problem list.    Past Medical History:   Diagnosis Date    Anxiety     Depression     Headache     Visual impairment          Current Outpatient Medications:     doxepin (SINEquan) 50 MG capsule, Take 1 capsule by mouth Every Night., Disp: 30 capsule, Rfl: 5    ferrous sulfate 325 (65 FE) MG tablet, Take 1 tablet by mouth 2 (Two) Times a Day., Disp: 60 tablet, Rfl: 5    Zafemy 150-35 MCG/24HR, APPLY 1 PATCH TOPICALLY ONCE A WEEK CONTINUOUSLY AS DIRECTED, Disp: , Rfl:     busPIRone (BUSPAR) 10 MG tablet, Take 1 tablet by mouth 3 (Three) Times a Day As Needed (Anxiety)., Disp: 90 tablet, Rfl: 5    DULoxetine (Cymbalta) 30 MG capsule, Take 1 capsule by mouth Daily., Disp: 30 capsule, Rfl: 5    Review of Systems    Review of Systems   Constitutional: Negative.    HENT: Negative.     Eyes: Negative.    Respiratory: Negative.     Cardiovascular: Negative.    Gastrointestinal: Negative.    Endocrine: Negative.    Genitourinary: Negative.    Musculoskeletal: Negative.   "  Skin: Negative.    Allergic/Immunologic: Negative.    Neurological: Negative.    Hematological: Negative.    Psychiatric/Behavioral:  Positive for decreased concentration, dysphoric mood and sleep disturbance. Negative for agitation, confusion, self-injury and suicidal ideas. The patient is nervous/anxious.    All other systems reviewed and are negative.    Objective  Vitals:    08/17/23 1404   Pulse: 80   SpO2: 98%   Weight: 51 kg (112 lb 6.4 oz)   Height: 154.9 cm (61\")     Body mass index is 21.24 kg/mý.    Physical Exam    Physical Exam  Vitals and nursing note reviewed.   Constitutional:       General: She is not in acute distress.     Appearance: Normal appearance. She is well-developed. She is not ill-appearing, toxic-appearing or diaphoretic.   HENT:      Head: Normocephalic and atraumatic.   Eyes:      General: No scleral icterus.        Right eye: No discharge.         Left eye: No discharge.      Extraocular Movements: Extraocular movements intact.      Conjunctiva/sclera: Conjunctivae normal.   Neck:      Vascular: No carotid bruit.   Cardiovascular:      Rate and Rhythm: Normal rate and regular rhythm.      Pulses: Normal pulses.      Heart sounds: Normal heart sounds. No murmur heard.    No friction rub. No gallop.   Pulmonary:      Effort: Pulmonary effort is normal. No respiratory distress.      Breath sounds: Normal breath sounds. No stridor. No wheezing, rhonchi or rales.   Chest:      Chest wall: No tenderness.   Abdominal:      General: Bowel sounds are normal. There is no distension.      Palpations: Abdomen is soft. There is no mass.      Tenderness: There is no abdominal tenderness. There is no guarding or rebound.      Hernia: No hernia is present.   Musculoskeletal:      Cervical back: Normal range of motion and neck supple. No rigidity or tenderness. No muscular tenderness.      Right lower leg: No edema.      Left lower leg: No edema.   Lymphadenopathy:      Cervical: No cervical " adenopathy.   Skin:     General: Skin is warm and dry.      Capillary Refill: Capillary refill takes less than 2 seconds.      Coloration: Skin is not jaundiced or pale.      Findings: No bruising, erythema, lesion or rash.   Neurological:      General: No focal deficit present.      Mental Status: She is alert and oriented to person, place, and time. Mental status is at baseline.   Psychiatric:         Mood and Affect: Mood normal.         Behavior: Behavior normal.         Thought Content: Thought content normal.         Judgment: Judgment normal.         Diagnoses and all orders for this visit:    1. Annual physical exam (Primary)  -     CBC w AUTO Differential; Future  -     Comprehensive metabolic panel; Future  -     TSH  -     Lipid panel; Future    2. Iron deficiency anemia, unspecified iron deficiency anemia type  -     Iron and TIBC; Future  -     Folate; Future    3. Anxiety  -     Vitamin D 25 Hydroxy; Future  -     busPIRone (BUSPAR) 10 MG tablet; Take 1 tablet by mouth 3 (Three) Times a Day As Needed (Anxiety).  Dispense: 90 tablet; Refill: 5  -     DULoxetine (Cymbalta) 30 MG capsule; Take 1 capsule by mouth Daily.  Dispense: 30 capsule; Refill: 5    4. Marijuana use    5. Insomnia, unspecified type    6. B12 deficiency  -     Vitamin B12; Future    7. Current moderate episode of major depressive disorder without prior episode  -     DULoxetine (Cymbalta) 30 MG capsule; Take 1 capsule by mouth Daily.  Dispense: 30 capsule; Refill: 5       Patient in for annual physical exam with labs.  She will return fasting to have routine labs as above drawn.  We will notify her of these results once they are available.  Her PHQ-9 score is 18 and GULSHAN-7 score is 8.  I will increase her BuSpar to 10 mg 3 times daily as needed anxiety and we will start her on Cymbalta for the anxiety and depression.  She is to continue her current medications.  She does not need refills today.  She is encouraged to get her Pap smear  performed.  She is counseled on the diet and exercise.  She is advised if the depression and anxiety does not improve in 1 to 2 months to follow-up for further evaluation and treatment.  Otherwise, she is to follow-up in 6 months for recheck on her medical conditions.  Answered all questions.  Patient verbalized understanding of plan of care.          This document has been electronically signed by DAMION Chavez on August 17, 2023 16:14 CDT